# Patient Record
Sex: FEMALE | Race: OTHER | Employment: UNEMPLOYED | URBAN - METROPOLITAN AREA
[De-identification: names, ages, dates, MRNs, and addresses within clinical notes are randomized per-mention and may not be internally consistent; named-entity substitution may affect disease eponyms.]

---

## 2020-12-09 ENCOUNTER — TELEPHONE (OUTPATIENT)
Dept: NEUROLOGY | Age: 55
End: 2020-12-09

## 2020-12-10 ENCOUNTER — OFFICE VISIT (OUTPATIENT)
Dept: NEUROLOGY | Age: 55
End: 2020-12-10
Payer: MEDICAID

## 2020-12-10 VITALS
RESPIRATION RATE: 20 BRPM | SYSTOLIC BLOOD PRESSURE: 128 MMHG | TEMPERATURE: 98 F | OXYGEN SATURATION: 97 % | HEART RATE: 72 BPM | DIASTOLIC BLOOD PRESSURE: 82 MMHG

## 2020-12-10 DIAGNOSIS — R29.2 HYPERREFLEXIA OF LOWER EXTREMITY: ICD-10-CM

## 2020-12-10 DIAGNOSIS — I63.9 LEFT PONTINE CVA (HCC): Primary | ICD-10-CM

## 2020-12-10 DIAGNOSIS — E78.2 MIXED HYPERLIPIDEMIA: ICD-10-CM

## 2020-12-10 DIAGNOSIS — R20.0 LEFT LEG NUMBNESS: ICD-10-CM

## 2020-12-10 DIAGNOSIS — I77.3 FIBROMUSCULAR DYSPLASIA OF BOTH CAROTID ARTERIES (HCC): ICD-10-CM

## 2020-12-10 PROCEDURE — 99205 OFFICE O/P NEW HI 60 MIN: CPT | Performed by: PSYCHIATRY & NEUROLOGY

## 2020-12-10 RX ORDER — LANOLIN ALCOHOL/MO/W.PET/CERES
5000 CREAM (GRAM) TOPICAL DAILY
COMMUNITY

## 2020-12-10 RX ORDER — ASPIRIN 81 MG/1
TABLET ORAL DAILY
COMMUNITY
End: 2021-09-30

## 2020-12-10 RX ORDER — CLOPIDOGREL BISULFATE 75 MG/1
TABLET ORAL
COMMUNITY
End: 2020-12-16 | Stop reason: ALTCHOICE

## 2020-12-10 NOTE — PROGRESS NOTES
NEUROLOGY CLINIC NOTE    Patient ID:  Chito Self  [id number]  54 y.o.  1965    Date of Consultation:  December 10, 2020    Referring Physician: Dr. Dianna Javier    Reason for Consultation:  Numbness left leg and foot    Chief Complaint   Patient presents with    New Patient     numbness in left leg and foot        History of Present Illness: There are no active problems to display for this patient. Past Medical History:   Diagnosis Date    Cerebral artery occlusion with cerebral infarction [facility])       Past Surgical History:   Procedure Laterality Date    HX HYSTERECTOMY  2007      Prior to Admission medications    Medication Sig Start Date End Date Taking? Authorizing Provider   clopidogreL (Plavix) 75 mg tab Take  by mouth. Yes Provider, Historical   aspirin delayed-release 81 mg tablet Take  by mouth daily. Yes Provider, Historical   cyanocobalamin (VITAMIN B12) 500 mcg tablet Take 5,000 mcg by mouth daily. Yes Provider, Historical     No Known Allergies   Social History     Tobacco Use    Smoking status: Not on file   Substance Use Topics    Alcohol use: Not on file      Family History   Problem Relation Age of Onset    Diabetes Mother     Hypertension Mother     Heart Disease Father     Migraines Sister         Subjective:      Chito Self is a 54 y.o. RH female with history of pontine CVA who was referred here by Dr. Dianna Javier for further evaluation of her left leg numbness. Review of records reveal condition started 3/19/2020 when patient was driving she was seeing 2 cars. Also felt pressure behind her eyes. Blurring and seeing double. Patient was admitted at [facility] in [place] last 3/23/2020 up to 3/25/2020. Patient was seen by ophthalmology and referred to the emergency room for MRI with and without contrast and consultation with neurology.   Exam then revealed right eye sluggish abduction with left eye unable to fully converge with horizontal nystagmus with rotational component. MRI of the brain and orbits with and without contrast did not reveal any significant pathology. No abnormal enhancements. CT angiogram of the head and neck revealed fibromuscular dysplasia of bilateral internal carotid arteries. Lumbar puncture was done with unremarkable cell counts and normal protein and glucose. Negative CMV, EBV, HSV, Lyme titers, West Nile and syphilis. Note mentions on day of discharge her blurry vision and diplopia had improved significantly but patient was reporting graying out of her vision on the right eye. Seen by ophthalmology with no changes in their exam.  CBC, BMP, TSH, B12 and folate were unremarkable. Hemoglobin A1c was slightly elevated at 5.9. Patient was admitted again 4/19/2020 and discharged 4/20/2020 in the same hospital.  Note mentions patient complaining of right-sided numbness for 2 days. Patient also endorses mild right arm weakness and unsteadiness. Brain MRI without contrast done 4/19/2020 revealed new lesion within the left dorsal cyndy with mild hyperintense signal on DWI and T2 shine. Scattered foci of abnormal T2 hyperintensities in the periventricular and subcortical white matter in both hemispheres. Finding consistent with subacute infarct. Head and neck CTA revealed findings consistent with fibromuscular dysplasia within bilateral cervical internal carotid arteries and vertebral arteries. Echocardiogram done revealed EF of 63%. LDL was elevated 198. Patient was placed on aspirin 325 mg daily and Plavix 75 mg daily. Patient is also on atorvastatin 80 mg daily. Exam revealed diminished sensation right extremity. Since then patient reports improvement of her condition. No recurrence of her diplopia and blurring of her vision. Right sided numbness and weakness improved as well. Then last 11/13/2020 she had a abrupt onset of left below the breast and flank numbness down to the left leg. It is constant.  Not a pain but merely a discomfort. Went to [facility] ER and was discharged and advised to see neurology for further evaluation. Outside reports reviewed: ER records, radiology reports, lab reports, historical medical records. Review of Systems:    A comprehensive review of systems was performed:   Constitutional: positive for none  Eyes: positive for none  Ears, nose, mouth, throat, and face: positive for none  Respiratory: positive for none  Cardiovascular: positive for none  Gastrointestinal: positive for none  Genitourinary: positive for none  Integument/breast: positive for none  Hematologic/lymphatic: positive for none  Musculoskeletal: positive for none  Neurological: positive for numbness  Behavioral/Psych: positive for none  Endocrine: positive for none  Allergic/Immunologic: positive for none      Objective:     Visit Vitals  /82   Pulse 72   Temp 98 °F (36.7 °C)   Resp 20   SpO2 97%       PHYSICAL EXAM:    General Appearance: Alert, patient appears stated age. General:  Well developed, well nourished, patient in no apparent distress. Head/Face: The head is normocephalic and atraumatic. Eyes: Conjunctivae appear normal. Sclera appear normal and non-icteric. Nose (and Sinus):   No abnormality of the nose or sinuses is noted. Oral:   Throat is clear. Lymphatics:  No lymphadenopathy in the neck/head. Neck and Thyroid:   No bruits noted in the neck. Respiratory:  Lungs clear to auscultation. Cardiovascular:  Palpation and auscultation: regular rate and rhythm. Extremity: No joint swelling, erythema or pedal edema. NEUROLOGICAL EXAM:    Appearance: The patient is well developed, well nourished, provides a coherent history and is in no acute distress. Mental Status: Oriented to time, place and person. Fluent, no aphasia or dysarthria. Mood and affect appropriate. Cranial Nerves:   Intact visual fields. ROSITA, EOM's full, no nystagmus, no ptosis.  Facial sensation is normal. Corneal reflexes are intact. Facial movement is symmetric. Hearing is normal bilaterally. Palate is midline with normal elevation. Sternocleidomastoid and trapezius muscles are normal. Tongue is midline. Motor:  5/5 strength in upper and lower proximal and distal muscles. Normal bulk and tone. No fasciculations. No pronator drift. Reflexes:   Deep tendon reflexes 2+/4 and 3+ knee and 4/4 ankle with clonus. Downgoing toes. Sensory:   Normal to cold, pinprick and vibration except decrease left lower leg to foot. Gait:  Steady. No Romberg. Can do tandem walking. Tremor:   No tremor noted. Cerebellar:  Intact FTN/YASMIN/HTS. Neurovascular:  Normal heart sounds and regular rhythm, peripheral pulses intact, and no carotid bruits. Imaging  Brain MRI x 3, head/neck CTA x 2: reviewed    Labs Reviewed      Assessment:   Left pontine CVA  Hyperlipidemia  Fibromuscular dysplasia bilateral ICA  Hyperreflexia of lower extremity    Plan:   Neurological examination mainly reveals decreased sensation left lower extremity with lower extremity hyperreflexia and ankle clonus. Need to assess for acute stroke. Status post left pontine CVA. Brain MRI without contrast was ordered to further assess. Previous echocardiogram was unremarkable. Continue aspirin 81 mg daily and Plavix 75 mg daily for stroke prophylaxis. Further intervention be done pending results of testing. Call 911 if new deficits occur. Given history of stroke patient's LDL should be less than 70. Advised strict compliance with dietary modifications and medications for hyperlipidemia. Review of head and neck CTA done twice revealed fibromuscular dysplasia changes bilateral ICA and vertebral arteries. Continue dual antiplatelet therapy. Of concern is lower extremity hyperreflexia as well as the left truncal and lower leg sensory deficits. Assess for spinal cord myelitis versus stenosis. Thoracic and lumbar MRIs were ordered.   Further intervention be done pending results of testing. All questions and concerns were answered. This note was created using voice recognition software. Despite editing, there may be syntax errors.

## 2020-12-10 NOTE — PROGRESS NOTES
Right foot numbness   Affected vision, double and blurred vision   April 18th back to the hospital numbness on the right side of her body, pressure in her head- after scans were done they found a very small clot in the back of her head     November 13th she was taking a shower, she wasn't feeling on her left side, numb all the way up from the foot to her breast   Foot feels like she is wearing compression socks, it gets very cold     Went to see PCP they gave her the plavix   The lack of sensation and numbness is getting worse on the left side is worse it started on the right side first     Still has the pressure in her head, everytime she puts her head on the pillow she feels palpitations in her head as if someone is hitting her head     December 7th went to the ER at [facility] her foot was completely numb and it is still that way, nothing was done that day she was just told to find a neurologist

## 2020-12-10 NOTE — PATIENT INSTRUCTIONS
PRESCRIPTION REFILL POLICY [facility] Statement to Patients April 1, 2014 In an effort to ensure the large volume of patient prescription refills is processed in the most efficient and expeditious manner, we are asking our patients to assist us by calling your Pharmacy for all prescription refills, this will include also your  Mail Order Pharmacy. The pharmacy will contact our office electronically to continue the refill process. Please do not wait until the last minute to call your pharmacy. We need at least 48 hours (2days) to fill prescriptions. We also encourage you to call your pharmacy before going to  your prescription to make sure it is ready. With regard to controlled substance prescription refill requests (narcotic refills) that need to be picked up at our office, we ask your cooperation by providing us with at least 72 hours (3days) notice that you will need a refill. We will not refill narcotic prescription refill requests after 4:00pm on any weekday, Monday through Thursday, or after 2:00pm on Fridays, or on the weekends. We encourage everyone to explore another way of getting your prescription refill request processed using Jibe, our patient web portal through our electronic medical record system. Jibe is an efficient and effective way to communicate your medication request directly to the office and  downloadable as an tiffanie on your smart phone . Jibe also features a review functionality that allows you to view your medication list as well as leave messages for your physician. Are you ready to get connected? If so please review the attatched instructions or speak to any of our staff to get you set up right away! Thank you so much for your cooperation. Should you have any questions please contact our Practice Administrator. The Physicians and Staff,  [facility] Aprenda cómo prevenir un ataque cerebral 
 Learning About How to Prevent a Stroke Figueroa Jeanne es un ataque cerebral? 
Un ataque cerebral se produce cuando un vaso sanguíneo en el cerebro se revienta o se obstruye debido a un coágulo de Rod. Sin la omar y el oxígeno que esta transporta, parte del cerebro comienza a morirse. La parte del cuerpo controlada por la yoly dañada del cerebro no puede funcionar adecuadamente. Jossie hay muchas cosas que usted puede hacer para ayudar a reducir lovelace riesgo de ataque cerebral. 

Qué aumenta lovelace riesgo de ataque cerebral? 
Un factor de riesgo es cualquier cosa que aumenta tanya probabilidades de tener un problema de margarita determinado. Los factores de riesgo de un ataque cerebral que usted puede tratar o cambiar incluyen: · Problemas de [employer] presión arterial pierce, la fibrilación auricular, la diabetes y el colesterol alto. · El hábito de fumar. · El consumo excesivo de alcohol. · Tener sobrepeso. · No hacer suficiente actividad física. Los factores de riesgo que usted no puede cambiar incluyen: · La edad. El riesgo de ataque cerebral aumenta con la edad. · La jes. Los afroamericanos, los [employer] y los nativos de [place] tienen un riesgo mayor que las personas de [place]. · Ser abundio. Las mujeres tienen un mayor riesgo de ataque cerebral que los hombres. · Antecedentes familiares de ataque cerebral. 
Lovelace médico puede ayudarle a conocer lovelace riesgo. Entonces, usted y lovelace médico pueden hablar sobre lo que tiene que hacer para reducirlo. Qué puede hacer para prevenir un ataque cerebral? 
· Trate cualquier problema de margarita que tenga y que aumente lovelace riesgo. · Lleve un estilo de kelly saludable para el corazón: ? No fume. Si necesita ayuda para dejar de fumar, hable con lovelace médico sobre programas y medicamentos para dejar de fumar. Estos pueden aumentar tanya probabilidades de dejar de fumar para siempre. ? Limite el alcohol a 2 bebidas al día si es hombre, y 1 bebida al día si es [place]. ? Manténgase en un peso saludable. Baje de peso si necesita hacerlo. ? Si lovelace médico lo recomienda, song más ejercicio. Caminar es loli buena opción. Poco a poco, aumente la distancia que camina cada día. Trate de hacer al menos 30 minutos de ejercicio la mayoría de los días de la [place]. ? Coma alimentos saludables para el corazón. Estos incluyen frutas, verduras, alimentos ricos en fibra y pescado. Elija alimentos que tengan un bajo contenido de [place], grasas saturadas y grasas trans. · Junto con lovelace médico, decida si además tomará medicamentos para ayudar a reducir lovelace riesgo. Por ejemplo, lovelace médico y usted podrían decidir que usted tomará un medicamento para prevenir los coágulos de Rod. Cuáles son los síntomas de un ataque cerebral? 
Los síntomas de un ataque cerebral se producen rápidamente. Un ataque cerebral puede causar: · Entumecimiento, hormigueo, debilidad, o pérdida del movimiento en la negar, el brazo o la pierna, especialmente en un solo lado del cuerpo, los cuales se presentan de manera repentina. · Cambios en la vista repentinos. · Problemas repentinos para hablar. · Confusión o problemas repentinos para comprender frases sencillas. · Problemas repentinos para caminar o de equilibrio. · Un dolor de geronimo intenso repentino que es diferente de caitie de geronimo anteriores. Las siglas FAST son Lendel Heppner sencilla de recordar los síntomas principales de un ataque cerebral. Reconocer estos síntomas le ayuda a saber cuándo llamar y pedir ayuda médica. Las letras de la palabra FAST (\"rápido\" en inglés) significan: · F ace drooping (negar caída). · A rm weakness (debilidad en el brazo). · S peech difficulty (dificultad para hablar). · T сергей to call 911 (momento de llamar al 911). Es importante llamar para conseguir ayuda médica si tiene síntomas de un ataque cerebral. Un tratamiento rápido puede salvarle la kelly.  Y puede reducirle el daño en el cerebro, de manera que tenga menos problemas después del ataque cerebral. 
La atención de seguimiento es loli parte clave de lovelace tratamiento y seguridad. Asegúrese de hacer y acudir a todas las citas, y llame a lovelace médico si está teniendo problemas. También es loli buena idea saber los resultados de tanya exámenes y mantener loli lista de los medicamentos que braulio. Dónde puede encontrar más información en inglés? Annita Ricardo a [email or web address].gray.com/ Michel [id number] en la búsqueda para aprender más acerca de \"Aprenda cómo prevenir un ataque cerebral.\" Revisado: 4 marzo, 2020               Versión del contenido: 12.6 © 5261-3691 Healthwise, Incorporated. Las instrucciones de cuidado fueron adaptadas bajo licencia por [employer] Connections (which disclaims liability or warranty for this information). Si usted tiene [place] afección médica o sobre estas instrucciones, siempre pregunte a lovelace profesional de margarita. Healthwise, Incorporated niega toda garantía o responsabilidad por lovelace uso de esta información. Aprenda cómo prevenir otro ataque cerebral 
Learning About How to Prevent Another Stroke Qué puede hacer para prevenir otro ataque cerebral? 
Después de un ataque cerebral, las personas sienten muchas emociones diferentes. A algunas personas les preocupa la posibilidad de tener otro ataque cerebral. O tra vez se sienten abrumadas por lo mucho que tienen que aprender y hacer. Algunas personas se sienten tristes o deprimidas. No importa las emociones que tenga, usted puede sentir un poco más de control y tranquilidad elaborando un plan para reducir el riesgo de sufrir otro ataque cerebral. 
[place] tanya medicamentos Tendrá que anahi medicamentos para ayudar a prevenir otro ataque cerebral. Asegúrese de anahi los medicamentos tra jesse se los mccormack recetado. Y no deje de tomarlos a menos que el [employer] diga que lo song.  Si usted harlan de anahi tanya medicamentos, puede aumentar las probabilidades de Agia Thekla otro ataque cerebral. 
 Algunos de los [place]ath[place] lovelace médico puede recetarle incluyen: · Aspirina o algún otro medicamento para prevenir los coágulos de [place]. · Estatinas y otros medicamentos para reducir el colesterol. · Medicamentos para reducir la presión arterial. 
Controle otros problemas de [place] Usted puede ayudar a reducir las probabilidades de tener otro ataque cerebral controlando otros problemas de [place]. Los problemas que aumentan lovelace riesgo de tener otro ataque cerebral incluyen: · Presión arterial pierce. · Colesterol alto. · Fibrilación auricular. · Diabetes. Si usted tiene cualquiera de [facility] [place], puede manejarlos por medio de cambios saludables en el estilo de kelly junto con medicamentos. Adopte un estilo de kelly saludable · No fume ni permita que otros fumen a lovelace alrededor. Si necesita ayuda para dejar de fumar, hable con lovelace médico sobre programas y medicamentos para dejar de fumar. Estos pueden aumentar tanya probabilidades de dejar de fumar para siempre. Fumar aumenta la probabilidad de un ataque cerebral. 
· Limite el alcohol a 2 bebidas al día si es hombre y a 1 bebida al día si es [place]. · Baje de peso si necesita hacerlo. Controlar el [employer] ayudará a mantener lovelace corazón y lovelace cuerpo sanos. · Sanaztis Arnold. Pregúntele a lovelace médico el tipo y [address] de actividad que es seguro para usted. · Coma alimentos saludables para el corazón, jesse frutas, verduras y alimentos ricos Pablo Bhagat. También es importante: · Vacunarse contra la gripe todos los [place]. · Pedir ayuda si piensa que está deprimido.  
Norma rehabilitación después de un ataque cerebral 
Participar en un programa de rehabilitación después de un ataque cerebral le ayudará a recuperar las habilidades que perdió o sacar el bibi provecho de las capacidades restantes después de un ataque cerebral. También le ayuda a anahi medidas para evitar tener otro ataque cerebral. 
 Marsh equipo de rehabilitación le ofrecerá educación y [de-identified] para ayudarle a desarrollar nuevos hábitos saludables. Lorrine Edu a controlar otros problemas de margarita que tenga. Barrington Severin a hacer ejercicio de manera campbell, a comer de manera saludable y a dejar de fumar si usted fuma. Usted colaborará con marsh equipo para decidir qué opciones de estilo de kelly son mejores para usted. Si marsh médico no lo ha sugerido todavía, pregúntele si la rehabilitación después de un ataque cerebral es adecuada para usted. Conozca los síntomas del ataque cerebral 
Asegúrese de que conoce los síntomas del ataque cerebral. 
Las siglas FAST son Beckie Mall sencilla de recordar. Reconocer estos síntomas le ayuda a saber cuándo llamar y pedir ayuda médica. Las letras de la palabra FAST (\"rápido\" en inglés) significan: · F ace drooping (negar caída). · A rm weakness (debilidad en el brazo). · S peech difficulty (dificultad para hablar). · T сергей to call 911 (momento de llamar al 911). La atención de seguimiento es loli parte clave de marsh tratamiento y seguridad. Asegúrese de hacer y acudir a todas las citas, y llame a marsh médico si está teniendo problemas. También es loli buena idea saber los resultados de tanya exámenes y mantener loli lista de los medicamentos que braulio. Dónde puede encontrar más información en inglés? Vale Menjivar a http://www.gray.com/ Michel [email or web address] en la búsqueda para aprender más acerca de \"Aprenda cómo prevenir otro ataque cerebral.\" Revisado: 4 marzo, 2020               Versión del contenido: 12.6 © 4156-6377 [employer]. Las instrucciones de cuidado fueron adaptadas bajo licencia por Good Help Connections (which disclaims liability or warranty for this information). Si usted tiene [place] afección médica o sobre estas instrucciones, siempre pregunte a marsh profesional de margarita.  [employer] niega toda garantía o responsabilidad por marsh uso de esta información.

## 2020-12-13 ENCOUNTER — HOSPITAL ENCOUNTER (OUTPATIENT)
Dept: MRI IMAGING | Age: 55
Discharge: HOME | End: 2020-12-13
Attending: PSYCHIATRY & NEUROLOGY

## 2020-12-13 VITALS — WEIGHT: 185 LBS

## 2020-12-13 DIAGNOSIS — I63.9 LEFT PONTINE CVA (HCC): ICD-10-CM

## 2020-12-13 DIAGNOSIS — R20.0 LEFT LEG NUMBNESS: ICD-10-CM

## 2020-12-13 DIAGNOSIS — R29.2 HYPERREFLEXIA OF LOWER EXTREMITY: ICD-10-CM

## 2020-12-13 PROCEDURE — 72157 MRI CHEST SPINE W/O & W/DYE: CPT

## 2020-12-13 PROCEDURE — A9575 INJ GADOTERATE MEGLUMI 0.1ML: HCPCS | Performed by: PSYCHIATRY & NEUROLOGY

## 2020-12-13 PROCEDURE — 74011250636 HC RX REV CODE- 250/636: Performed by: PSYCHIATRY & NEUROLOGY

## 2020-12-13 PROCEDURE — 72158 MRI LUMBAR SPINE W/O & W/DYE: CPT

## 2020-12-13 PROCEDURE — 70551 MRI BRAIN STEM W/O DYE: CPT

## 2020-12-13 RX ORDER — GADOTERATE MEGLUMINE 376.9 MG/ML
17 INJECTION INTRAVENOUS
Status: COMPLETED | OUTPATIENT
Start: 2020-12-13 | End: 2020-12-13

## 2020-12-13 RX ADMIN — GADOTERATE MEGLUMINE 17 ML: 376.9 INJECTION INTRAVENOUS at 10:30

## 2020-12-16 ENCOUNTER — OFFICE VISIT (OUTPATIENT)
Dept: NEUROLOGY | Age: 55
End: 2020-12-16
Payer: MEDICAID

## 2020-12-16 VITALS
RESPIRATION RATE: 20 BRPM | HEART RATE: 88 BPM | OXYGEN SATURATION: 98 % | SYSTOLIC BLOOD PRESSURE: 116 MMHG | DIASTOLIC BLOOD PRESSURE: 82 MMHG

## 2020-12-16 DIAGNOSIS — E78.2 MIXED HYPERLIPIDEMIA: ICD-10-CM

## 2020-12-16 DIAGNOSIS — G35 MULTIPLE SCLEROSIS (HCC): Primary | ICD-10-CM

## 2020-12-16 DIAGNOSIS — I77.3 FIBROMUSCULAR DYSPLASIA OF BOTH CAROTID ARTERIES (HCC): ICD-10-CM

## 2020-12-16 PROCEDURE — 99215 OFFICE O/P EST HI 40 MIN: CPT | Performed by: PSYCHIATRY & NEUROLOGY

## 2020-12-16 RX ORDER — FAMOTIDINE 40 MG/1
40 TABLET, FILM COATED ORAL DAILY
Qty: 30 TAB | Refills: 0 | Status: SHIPPED | OUTPATIENT
Start: 2020-12-16 | End: 2021-09-30

## 2020-12-16 RX ORDER — PREDNISONE 20 MG/1
TABLET ORAL
Qty: 78 TAB | Refills: 0 | Status: SHIPPED | OUTPATIENT
Start: 2020-12-16 | End: 2021-05-11

## 2020-12-16 NOTE — PROGRESS NOTES
NEUROLOGY CLINIC NOTE    Patient ID:  Veronica Asif  [id number]  54 y.o.  1965    Date of Visit:  December 16, 2020    Referring Physician: Dr. Amalia Winters    Reason for Visit:  Numbness left leg and foot      History of Present Illness: There are no active problems to display for this patient. Past Medical History:   Diagnosis Date    Cerebral artery occlusion with cerebral infarction [facility])       Past Surgical History:   Procedure Laterality Date    HX HYSTERECTOMY  2007      Prior to Admission medications    Medication Sig Start Date End Date Taking? Authorizing Provider   clopidogreL (Plavix) 75 mg tab Take  by mouth. Provider, Historical   aspirin delayed-release 81 mg tablet Take  by mouth daily. Provider, Historical   cyanocobalamin (VITAMIN B12) 500 mcg tablet Take 5,000 mcg by mouth daily. Provider, Historical     No Known Allergies   Social History     Tobacco Use    Smoking status: Not on file   Substance Use Topics    Alcohol use: Not on file      Family History   Problem Relation Age of Onset    Diabetes Mother     Hypertension Mother     Heart Disease Father     Migraines Sister         Subjective:      Veronica Asif is a 54 y.o. RH female with history of pontine CVA who was referred here by Dr. Amalia Winters for further evaluation of her left leg numbness. Patient is here for follow up. Review of records reveal condition started 3/19/2020 when patient was driving she was seeing 2 cars. Also felt pressure behind her eyes. Blurring and seeing double. Patient was admitted at [facility] in [place] last 3/23/2020 up to 3/25/2020. Patient was seen by ophthalmology and referred to the emergency room for MRI with and without contrast and consultation with neurology. Exam then revealed right eye sluggish abduction with left eye unable to fully converge with horizontal nystagmus with rotational component.   MRI of the brain and orbits with and without contrast did not reveal any significant pathology. No abnormal enhancements. CT angiogram of the head and neck revealed fibromuscular dysplasia of bilateral internal carotid arteries. Lumbar puncture was done with unremarkable cell counts and normal protein and glucose. Negative CMV, EBV, HSV, Lyme titers, West Nile and syphilis. Note mentions on day of discharge her blurry vision and diplopia had improved significantly but patient was reporting graying out of her vision on the right eye. Seen by ophthalmology with no changes in their exam.  CBC, BMP, TSH, B12 and folate were unremarkable. Hemoglobin A1c was slightly elevated at 5.9. Patient was admitted again 4/19/2020 and discharged 4/20/2020 in the same hospital.  Note mentions patient complaining of right-sided numbness for 2 days. Patient also endorses mild right arm weakness and unsteadiness. Brain MRI without contrast done 4/19/2020 revealed new lesion within the left dorsal cyndy with mild hyperintense signal on DWI and T2 shine. Scattered foci of abnormal T2 hyperintensities in the periventricular and subcortical white matter in both hemispheres. Finding consistent with subacute infarct. Head and neck CTA revealed findings consistent with fibromuscular dysplasia within bilateral cervical internal carotid arteries and vertebral arteries. Echocardiogram done revealed EF of 63%. LDL was elevated 198. Patient was placed on aspirin 325 mg daily and Plavix 75 mg daily. Patient is also on atorvastatin 80 mg daily. Exam revealed diminished sensation right extremity. Since then patient reports improvement of her condition. No recurrence of her diplopia and blurring of her vision. Right sided numbness and weakness improved as well. Then last 11/13/2020 she had a abrupt onset of left below the breast and flank numbness down to the left leg. It is constant. Not a pain but merely a discomfort.  Went to [facility] and was discharged and advised to see neurology for further evaluation. Since the last visit on 12/10/2020, patient underwent brain MRI without contrast 12/13/2020 and it showed minimal scattered foci of increased T2 signal intensity in the posterior inferior midbrain, superior cyndy, tectum on the left subcortical white matter. Differential diagnosis includes demyelinating process, infectious/inflammatory process. No evidence of any acute or old strokes. Lumbar MRI with and without contrast revealed no lumbar stenosis or pathology seen. Increased cord signal intensity at T11-T12. No abnormal postcontrast enhancement. MRI of the thoracic spine with and without contrast revealed scattered abnormal foci of increased T2 signal intensity at C7-T1, T3-T4 and T11-T12. No abnormal enhancement seen. Per patient she continues to have numbness and discomfort left lower leg down to her foot. Outside reports reviewed: radiology reports    Review of Systems:    A comprehensive review of systems was performed:   Constitutional: positive for none  Eyes: positive for none  Ears, nose, mouth, throat, and face: positive for none  Respiratory: positive for none  Cardiovascular: positive for none  Gastrointestinal: positive for none  Genitourinary: positive for none  Integument/breast: positive for none  Hematologic/lymphatic: positive for none  Musculoskeletal: positive for none  Neurological: positive for numbness  Behavioral/Psych: positive for none  Endocrine: positive for none  Allergic/Immunologic: positive for none      Objective:     Visit Vitals  /82   Pulse 88   Resp 20   SpO2 98%       PHYSICAL EXAM:      NEUROLOGICAL EXAM:    Appearance: The patient is well developed, well nourished, provides a coherent history and is in no acute distress. Mental Status: Oriented to time, place and person. Fluent, no aphasia or dysarthria. Mood and affect appropriate. Cranial Nerves:   Intact visual fields.  ROSITA, EOM's full, no nystagmus, no ptosis. Facial sensation is normal. Corneal reflexes are intact. Facial movement is symmetric. Hearing is normal bilaterally. Palate is midline with normal elevation. Sternocleidomastoid and trapezius muscles are normal. Tongue is midline. Motor:  5/5 strength in upper and lower proximal and distal muscles. Normal bulk and tone. No fasciculations. No pronator drift. Reflexes:   Deep tendon reflexes 2+/4 and 3+ knee and 4/4 ankle with clonus. Downgoing toes. Sensory:   Normal to cold, pinprick and vibration except decrease left lower leg to foot. Gait:  Steady. No Romberg. Can do tandem walking. Tremor:   No tremor noted. Cerebellar:  Intact FTN/YASMIN/HTS. Imaging  Brain MRI, thoracic and lumbar MRI: reviewed    Assessment:   Multiple sclerosis  Hyperlipidemia  Fibromuscular dysplasia bilateral ICA    Plan:   Neurological examination mainly reveals decreased sensation left lower extremity with lower extremity hyperreflexia and ankle clonus. Neuroimaging films were reviewed with the patient.  was used. Brain MRI without contrast 12/13/2020 and it showed minimal scattered foci of increased T2 signal intensity in the posterior inferior midbrain, superior cyndy, tectum on the left subcortical white matter. Differential diagnosis includes demyelinating process, infectious/inflammatory process. No evidence of any acute or old strokes. Lumbar MRI with and without contrast revealed no lumbar stenosis or pathology seen. Increased cord signal intensity at T11-T12. No abnormal postcontrast enhancement. MRI of the thoracic spine with and without contrast revealed scattered abnormal foci of increased T2 signal intensity at C7-T1, T3-T4 and T11-T12. No abnormal enhancement seen. All his findings on the brain and spinal MRIs consistent with multiple sclerosis.   Extensive discussion with the help of a professional  was done with the patient in relation to the diagnosis, prognosis and treatment options. Trial of high-dose prednisone 30-day taper to see if it improves her current symptoms. Prescription was sent to her pharmacy. GI prophylaxis was also prescribed. JO virus testing was ordered to help with choice of treatment options. Patient lives in [place] and was advised to establish care with a neurologist and then decide on a disease modifying agent for her multiple sclerosis. Would recommend either Copaxone or generic Glatopa if prefers an injectable or Aubagio. Since there is no evidence that patient actually had a stroke, she was advised to discontinue Plavix. Advised strict compliance with dietary modifications and medications for hyperlipidemia. Review of head and neck CTA done twice revealed fibromuscular dysplasia changes bilateral ICA and vertebral arteries. Continue aspirin 81 mg daily. All questions and concerns were answered. 50 minutes was spent for this visit. Greater than 50% was spent reviewing actual MRI films with the patient, discussing her condition, etiology, prognosis, treatment options    This note was created using voice recognition software. Despite editing, there may be syntax errors.      Received labs done 11/19/2020 from PCP:  Normal CBC, CMP, lipid panel (LDL 75), TSH, ARON, anticardiolipin antibodies, antiphospholipid antibodies, CRP, RF, ESR  Slightly elevated hgbA1c at 6.2, low vitamin D at 27.2

## 2020-12-16 NOTE — PATIENT INSTRUCTIONS
Esclerosis múltiple (EM): Instrucciones de cuidado Multiple Sclerosis (MS): Care Instructions Instrucciones de cuidado La esclerosis múltiple, también conocida jesse EM, es loli enfermedad que puede afectar el cerebro, la médula crow y los nervios del anabella. La EM puede causar [employer] de los músculos y la fuerza, la visión, el equilibrio, la sensibilidad y la capacidad de razonar. Cualesquiera que merced tanya síntomas, anahi los medicamentos de manera correcta y seguir la recomendación de lovelace médico para el cuidado en el [facility] pueden ayudarle a conservar lovelace calidad de kelly. La atención de seguimiento es loli parte clave de lovelace tratamiento y seguridad. Asegúrese de hacer y acudir a todas las citas, y llame a lovelace médico si está teniendo problemas. También es loli buena idea saber los resultados de tanya exámenes y mantener loli lista de los medicamentos que braulio. Cómo puede cuidarse en el [facility]? Cuidado general 
· [employer] medicamentos exactamente según las indicaciones. Llame a lovelace médico si junior estar teniendo problemas con lovelace medicamento. · Utilice un bastón, un andador o loli silla motorizada si lovelace médico así lo sugiere. · En la medida de lo posible, siga haciendo todas tanya actividades normales. · Si tiene problemas para orinar, song presión o comprima suavemente la yoly de la vejiga para estimular el flujo de [facility]. Si tiene problemas para controlar la orina, planee lovelace braulio de líquidos y las actividades de modo que tenga un inodoro disponible cuando lo necesite. · Pase tiempo con lovelace kali y tanya amigos. Únase a un miladys de apoyo para personas con EM si necesita ayuda adicional. 
· Es común que la depresión acompañe a esta afección. Avísele a lovelace médico si tiene problemas para dormir, si come [place] o no tiene apetito, o se siente chiquis o afligido todo [address]. La depresión puede tratarse con medicamentos y asesoría psicológica. Alimentación y [address] · Siga loli [employer]ell Naval. · Si tiene problemas para tragar, cambie la manera de comer y los alimentos que consume: 
? Pruebe con bebidas espesas, jesse las [employer]. [place] fáciles de tragar que otros líquidos. ? No consuma alimentos que se desmigajen con facilidad. Estos pueden hacer que se atragante. ? Utilice loli licuadora para preparar los alimentos. Los alimentos blandos [employer]. ? Coma pequeñas cantidades de comida de manera frecuente para no cansarse por consumir comidas más grandes. · Norma ejercicio la [employer] días. [place] con lovelace médico para establecer un programa de actividades físicas que incluya caminar y nadar u otros ejercicios que pueda hacer. Un fisioterapeuta puede enseñarle ejercicios si no puede caminar federico puede  las extremidades y el tronco. O puede hacer ejercicios para ayudar con lovelace coordinación y equilibrio. Usted puede contribuir a mejorar la rigidez muscular si hace ejercicios al recostarse en ciertas posiciones. Cuándo debe pedir ayuda? Llame a lovelace médico ahora mismo o busque atención médica inmediata si: 
  · Tiene un cambio en los síntomas.  
  · Se  o tiene otra lesión.  
  · Presenta síntomas de loli infección urinaria. Por ejemplo: ? Tiene omar o pus en la orina. ? Tiene dolor de espalda sandi debajo de las [place] se llama dolor en el flanco. 
? Tiene fiebre, escalofríos o [employer] cuerpo. ? Corlis Massa al Jovita Riding. ? Tiene dolor en el abdomen o la tyrone. Preste especial atención a los cambios en lovelace margarita y asegúrese de comunicarse con lovelace médico si: 
  · Desea más información sobre la esclerosis múltiple o los medicamentos.  
  · Tiene preguntas sobre tratamientos alternativos. No use ningún otro tratamiento sin hablar antes con lovelace médico.  

Dónde puede encontrar más información en inglés? Judy monzon [email or web address] Olivia Jessica [id number] en la búsqueda para aprender más acerca de \"Esclerosis múltiple (EM): Instrucciones de cuidado. \" Revisado: 20 noviembre, 2019               Versión del contenido: 12.6 © 4221-3250 Healthwise, Incorporated. Las instrucciones de cuidado fueron adaptadas bajo licencia por [employer] (which disclaims liability or warranty for this information). Si usted tiene [place] afección médica o sobre estas instrucciones, siempre pregunte a lovelace profesional de margarita. Healthwise, Incorporated niega toda garantía o responsabilidad por lovelace uso de esta información.

## 2020-12-23 ENCOUNTER — TELEPHONE (OUTPATIENT)
Dept: NEUROLOGY | Age: 55
End: 2020-12-23

## 2020-12-23 LAB — JCPYV DNA SPEC QL NAA+PROBE: NEGATIVE

## 2020-12-23 NOTE — TELEPHONE ENCOUNTER
Pt's sister called and stated she has been taking the prednisone like she is supposed to however she has been having bad stomach pains, chest pain/pressure in her chest and in her neck. Her face looks red. She does have diabetes mentioned by her sister. She does not know what to do. She was advised that she needs to go to a [address] [place] [address] so they would have her information. I would not wait until provider returns to the office next week. I strongly encouraged her to the ER to be evaluated b/c of chest pain and pressure. Her sister verbalized understanding and stated she would let us know.

## 2021-05-10 ENCOUNTER — OFFICE VISIT (OUTPATIENT)
Dept: NEUROLOGY | Age: 56
End: 2021-05-10
Payer: MEDICAID

## 2021-05-10 VITALS — RESPIRATION RATE: 20 BRPM | DIASTOLIC BLOOD PRESSURE: 78 MMHG | SYSTOLIC BLOOD PRESSURE: 118 MMHG

## 2021-05-10 DIAGNOSIS — G35 MULTIPLE SCLEROSIS (HCC): Primary | ICD-10-CM

## 2021-05-10 DIAGNOSIS — E78.2 MIXED HYPERLIPIDEMIA: ICD-10-CM

## 2021-05-10 DIAGNOSIS — I77.3 FIBROMUSCULAR DYSPLASIA OF BOTH CAROTID ARTERIES (HCC): ICD-10-CM

## 2021-05-10 PROCEDURE — 99215 OFFICE O/P EST HI 40 MIN: CPT | Performed by: PSYCHIATRY & NEUROLOGY

## 2021-05-10 RX ORDER — GLATIRAMER 40 MG/ML
40 INJECTION, SOLUTION SUBCUTANEOUS
Qty: 12 SYRINGE | Refills: 11
Start: 2021-05-10 | End: 2021-06-24 | Stop reason: SDUPTHER

## 2021-05-10 NOTE — PROGRESS NOTES
She had stopped taking her medication the prednisone, had a reaction to the prednisone she had called and was advised to go to the ER to be evaluated     She was wondering since she didn't finish the medication what would happen or what does she need to do     She wanted to ask questions about MS- is this something that has progressed or since the diagnosis has it stayed the same     Her body does go numb, does that mean progression or? She has to have some dental work and has to be under medication and wanted to make sure that it was okay with her diagnosis as well as her eye surgery would it be okay if she has to be under medication and if that would affect her as well     She wanted to know about the COVID vaccination?      She feels like an ice cold/ hot sensations in her back     Neck pain when she moves her neck   Sometimes her face goes numb but that does go away
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
## 2021-05-10 NOTE — PATIENT INSTRUCTIONS
Esclerosis múltiple (EM): Instrucciones de cuidado  Multiple Sclerosis (MS): Care Instructions  Instrucciones de cuidado  La esclerosis múltiple, también conocida jesse EM, es loli enfermedad que puede afectar el cerebro, la médula crow y los nervios del anabella. La EM puede causar [employer] de los músculos y la fuerza, la visión, el equilibrio, la sensibilidad y la capacidad de razonar. Cualesquiera que merced tanya síntomas, anahi los medicamentos de manera correcta y seguir la recomendación de lovelace médico para el cuidado en el [facility] pueden ayudarle a conservar lovelace calidad de kelly. La atención de seguimiento es loli parte clave de lovelace tratamiento y seguridad. Asegúrese de hacer y acudir a todas las citas, y llame a lovelace médico si está teniendo problemas. También es loli buena idea saber los resultados de tanya exámenes y mantener loli lista de los medicamentos que braulio. ¿Cómo puede cuidarse en el [facility]? Cuidado general  · [employer] medicamentos exactamente según las indicaciones. Llame a lovelace médico si junior estar teniendo problemas con lovelace medicamento. · Utilice un bastón, un andador o loli silla motorizada si lovelace médico así lo sugiere. · En la medida de lo posible, siga haciendo todas tanya actividades normales. · Si tiene problemas para orinar, song presión o comprima suavemente la yoly de la vejiga para estimular el flujo de [facility]. Si tiene problemas para controlar la orina, planee lovelace braulio de líquidos y las actividades de modo que tenga un inodoro disponible cuando lo necesite. · Pase tiempo con lovelace kali y tanya amigos. Únase a un miladys de apoyo para personas con EM si necesita ayuda adicional.  · Es común que la depresión acompañe a esta afección. Avísele a lovelace médico si tiene problemas para dormir, si come [place] o no tiene apetito, o se siente chiquis o afligido todo [address]. La depresión puede tratarse con medicamentos y asesoría psicológica. Alimentación y ejercicio  · Siga loli Ann Marie Gramajo.   · Si tiene problemas para tragar, cambie la manera de comer y los alimentos que consume:  ? Pruebe con bebidas espesas, jesse las [employer]. [place] fáciles de tragar que otros líquidos. ? No consuma alimentos que se desmigajen con facilidad. Estos pueden hacer que se atragante. ? Utilice loli licuadora para preparar los alimentos. Los alimentos blandos [employer]. ? Coma pequeñas cantidades de comida de manera frecuente para no cansarse por consumir comidas más grandes. · Norma ejercicio la [employer] días. [place] con lovelace médico para establecer un programa de actividades físicas que incluya caminar y nadar u otros ejercicios que pueda hacer. Un fisioterapeuta puede enseñarle ejercicios si no puede caminar federico puede  las extremidades y el tronco. O puede hacer ejercicios para ayudar con lovelace coordinación y equilibrio. Usted puede contribuir a mejorar la rigidez muscular si hace ejercicios al recostarse en ciertas posiciones. ¿Cuándo debe pedir ayuda? Llame a lovelace médico ahora mismo o busque atención médica inmediata si:    · Tiene un cambio en los síntomas.     · Se  o tiene otra lesión.     · Presenta síntomas de loli infección urinaria. Por ejemplo:  ? Tiene omar o pus en la orina. ? Tiene dolor de espalda sandi debajo de las [place] se llama dolor en el flanco.  ? Tiene fiebre, escalofríos o [employer] cuerpo. ? Kendall Altamirano al Sharri Pancake. ? Tiene dolor en el abdomen o la tyrone. Preste especial atención a los cambios en lovelace margarita y asegúrese de comunicarse con lovelace médico si:    · Desea más información sobre la esclerosis múltiple o los medicamentos.     · Tiene preguntas sobre tratamientos alternativos. No use ningún otro tratamiento sin hablar antes con lovelace médico.   ¿Dónde puede encontrar más información en inglés?   Kamilla Alicea a [email or web address].gray.[email or web address]/  Michel C221 en la búsqueda para aprender más acerca de \"Esclerosis múltiple (EM): Instrucciones de cuidado. \"  Revisado: 4 agosto, 2020               Versión del contenido: 12.8  © 2006-2021 Healthwise, Incorporated. Las instrucciones de cuidado fueron adaptadas bajo licencia por [employer] (which disclaims liability or warranty for this information). Si usted tiene [place] afección médica o sobre estas instrucciones, siempre pregunte a lovelace profesional de margarita. Healthwise, Incorporated niega toda garantía o responsabilidad por lovelace uso de esta información. Colesterol alto: Instrucciones de cuidado  High Cholesterol: Care Instructions  Instrucciones de cuidado     El colesterol es un tipo de grasa que está presente en la Ray. Es necesario para varias funciones corporales, jesse producir nuevas células. El colesterol se produce en el cuerpo y [place] proviene de los alimentos que se consumen. Tener colesterol alto significa que tiene demasiado de esta grasa en la Ray. [place] eleva lovelace riesgo de tener un ataque Vernel Shmuel y un ataque cerebral.  El LDL y el HDL son parte de lovelace colesterol total. El LDL es el colesterol \"neto\". Un LDL alto puede aumentar lovelace riesgo de tener loli enfermedad cardíaca, un ataque Steve Cramp y un ataque cerebral. El HDL es el colesterol \"chappell\". Ayuda a eliminar el colesterol neto del organismo. Un HDL alto está vinculado con un riesgo más bajo de tener loli enfermedad cardíaca, un ataque al corazón y un ataque cerebral.  Lisha niveles de colesterol ayudan a lovelace médico a determinar lovelace riesgo de tener un ataque al corazón o un ataque cerebral. Usted y lovelace médico pueden hablar acerca de si necesita reducir lovelace riesgo y del tratamiento que sea mejor para usted. Un estilo de kelly saludable para el corazón junto con medicamentos puede ayudar a reducir lovelace colesterol y lovelace riesgo.  El modo que usted elija para reducir lovelace riesgo dependerá de lo elevado que sea lovelace riesgo de tener un ataque al corazón y un ataque cerebral. También dependerá de lo que piense acerca de anahi medicamentos. La atención de seguimiento es loli parte clave de lovelace tratamiento y seguridad. Asegúrese de hacer y acudir a todas las citas, y llame a lovelace médico si está teniendo problemas. También es loli buena idea saber los resultados de tanya exámenes y mantener loli lista de los medicamentos que braulio. ¿Cómo puede cuidarse en el hogar? · Coma alimentos saludables para el corazón. ? Coma frutas, verduras, granos integrales, frijoles y otros alimentos ricos en fibra. ? Coma proteínas magras, jesse mariscos, rosina [place], frijoles, nueces y productos de soya. ? Coma grasas saludables, jesse aceite de canola y de [place]. ? Elija alimentos que tengan un bajo contenido de grasas saturadas y [place] grasas trans. ? Limite el sodio y el alcohol. ? [address] [facility] [address] y los alimentos con azúcar añadida. · [place] física. Trate de hacer actividad moderada al menos 2½ horas a la semana. O trate de hacer actividad intensa al menos 1¼ horas a la semana. Juan vez desee caminar o probar otras actividades, jesse correr, nadar, [employer] en bicicleta o jugar al tenis o practicar deportes de equipo. · Manténgase en un peso saludable o baje de peso cambiando los hábitos alimenticios y la [facility] según lo descrito arriba. Si baja aunque sea solo un poco de [place], incluso 5 a 10 libras (2.2 a 4.5 kg), puede disminuir el riesgo de tener un ataque al corazón o un ataque cerebral.  · No fume. ¿Cuándo debe pedir ayuda? Preste especial atención a los cambios en lovelace margarita y asegúrese de comunicarse con lovelace médico si:    · Necesita ayuda para hacer cambios en el estilo de kelly.     · Tiene preguntas sobre lovelace medicamento. ¿Dónde puede encontrar más información en inglés? Albino El a http://www.gray.com/  Michel L099 en la búsqueda para aprender más acerca de \"Colesterol alto: Instrucciones de cuidado. \"  Revisado: 31 agosto, 2020               Versión del contenido: 12.8  © 1107-4954 Healthwise, Incorporated. Las instrucciones de cuidado fueron adaptadas bajo licencia por [employer] (which disclaims liability or warranty for this information). Si usted tiene [place] afección médica o sobre estas instrucciones, siempre pregunte a lovelace profesional de margarita. [employer] niega toda garantía o responsabilidad por lovelace uso de esta información.

## 2021-05-21 ENCOUNTER — TELEPHONE (OUTPATIENT)
Dept: NEUROLOGY | Age: 56
End: 2021-05-21

## 2021-05-21 NOTE — TELEPHONE ENCOUNTER
----- Message from Bruna Massey sent at 5/21/2021 11:30 AM EDT -----  Regarding: Dr. Akin Davidson first and last name: Pt and niece      Reason for call: medication       Callback required yes/no and why: Yes (will need ), please notify pt as to when medication will be sent to her home address      Best contact number(s): [phone] (niece Peterson Olivares- not on PHI, but she will connect the call with pt)      Details to clarify the request: Pt had her niece on the phone to translate, but states she has not received her medication, glatiramer (Glatopa) 40 mg/mL syrg . She was told this was a medication that cannot be sent to a regular pharmacy like [facility], it has to be sent to a specialty pharmacy. She was told this medication would be shipped out to her house. She has not received this medication. She states that she is needing this for the symptoms of numbness she has been experiencing. She says she can still move and walk, but just feels numb throughout her body. She said the numbness is worse and isn't sure if she needs to go to the hospital. Please reach out to patient to discuss where medication could be and when this will be shipped.       Bruna Massey

## 2021-06-02 ENCOUNTER — OFFICE VISIT (OUTPATIENT)
Dept: NEUROLOGY | Age: 56
End: 2021-06-02
Payer: MEDICAID

## 2021-06-02 VITALS
HEIGHT: 65 IN | WEIGHT: 171 LBS | DIASTOLIC BLOOD PRESSURE: 72 MMHG | SYSTOLIC BLOOD PRESSURE: 134 MMHG | BODY MASS INDEX: 28.49 KG/M2 | HEART RATE: 78 BPM | RESPIRATION RATE: 16 BRPM | OXYGEN SATURATION: 97 %

## 2021-06-02 DIAGNOSIS — E78.2 MIXED HYPERLIPIDEMIA: ICD-10-CM

## 2021-06-02 DIAGNOSIS — R20.2 PARESTHESIA: ICD-10-CM

## 2021-06-02 DIAGNOSIS — G35 MULTIPLE SCLEROSIS (HCC): Primary | ICD-10-CM

## 2021-06-02 DIAGNOSIS — I77.3 FIBROMUSCULAR DYSPLASIA OF BOTH CAROTID ARTERIES (HCC): ICD-10-CM

## 2021-06-02 DIAGNOSIS — M62.838 MUSCLE SPASM: ICD-10-CM

## 2021-06-02 PROCEDURE — 99215 OFFICE O/P EST HI 40 MIN: CPT | Performed by: PSYCHIATRY & NEUROLOGY

## 2021-06-02 RX ORDER — GABAPENTIN 100 MG/1
CAPSULE ORAL
Qty: 180 CAPSULE | Refills: 2 | Status: SHIPPED | OUTPATIENT
Start: 2021-06-02 | End: 2021-09-30 | Stop reason: SDUPTHER
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
## 2021-06-03 NOTE — TELEPHONE ENCOUNTER
Patient had an appt. Yesterday with Dr. Miguelito Manriquez and wanted her PT and OT to be done at the same place. Faxed referrals to [facility] and called the patient to let them know that they have been faxed. I also gave the patient their phone number upon request. Patient stated understanding.

## 2021-06-24 DIAGNOSIS — G35 MULTIPLE SCLEROSIS (HCC): ICD-10-CM

## 2021-06-24 RX ORDER — GLATIRAMER 40 MG/ML
40 INJECTION, SOLUTION SUBCUTANEOUS
Qty: 12 SYRINGE | Refills: 11 | Status: SHIPPED | OUTPATIENT
Start: 2021-06-25 | End: 2021-09-30 | Stop reason: SINTOL

## 2021-06-24 NOTE — TELEPHONE ENCOUNTER
----- Message from Sammie Ash sent at 6/23/2021  2:15 PM EDT -----  Regarding: Dr. Ayla Mejía Message/Vendor Calls    Caller's first and last name: Patient      Reason for call: have not received the Mail Order Injection as of yet and is concerned      Callback required yes/no and why: yes      Best contact number(s):[phone]      Details to clarify the request:      Sammie Ash

## 2021-08-11 ENCOUNTER — DOCUMENTATION ONLY (OUTPATIENT)
Dept: NEUROLOGY | Age: 56
End: 2021-08-11

## 2021-08-11 ENCOUNTER — TELEPHONE (OUTPATIENT)
Dept: NEUROLOGY | Age: 56
End: 2021-08-11

## 2021-08-11 NOTE — TELEPHONE ENCOUNTER
Two pt identifiers confirmed. Pt's Pharm [facility] called and requested a prior auth for Glatopa that Dr. Marylene Hover filled today, 8/11/21.      PA Code: [id number]

## 2021-08-13 ENCOUNTER — TELEPHONE (OUTPATIENT)
Dept: NEUROLOGY | Age: 56
End: 2021-08-13

## 2021-08-13 NOTE — TELEPHONE ENCOUNTER
----- Message from Tiburcio Nyhan sent at 8/13/2021  1:54 PM EDT -----  Regarding: /telephone  General Message/Vendor Calls    Caller's first and last name:      Reason for call: They stated the pt needs a \"whisperject\" device. It can be verbally called in.       Callback required yes/no and why:Yes      Best contact number(s): [facility] - [phone]      Details to clarify the request:

## 2021-08-17 NOTE — TELEPHONE ENCOUNTER
Called and spoke with the pt yesterday evening with an  to let her know her glatopa was approved and that I will call to get the medication shipped to her. [facility] and they already shipped her medication out to be delivered today. Called pt and left a VM to let the pt know it will be shipped out today and has to be signed for at delivery.

## 2021-08-19 ENCOUNTER — TRANSCRIBE ORDER (OUTPATIENT)
Dept: NEUROLOGY | Age: 56
End: 2021-08-19

## 2021-08-30 ENCOUNTER — TELEPHONE (OUTPATIENT)
Dept: NEUROLOGY | Age: 56
End: 2021-08-30

## 2021-08-31 NOTE — TELEPHONE ENCOUNTER
Order placed for Autoinjector, # 1/0R, per Verbal Order from Dr. Andrés Morgan on 8/31/2021 due to patient needing to use injector for medication. Pharmacist has verified the Rx and will get it ready for the patient.

## 2021-09-02 NOTE — TELEPHONE ENCOUNTER
----- Message from [facility] sent at 9/2/2021  3:20 PM EDT -----  Regarding: Dr. Carlos Manuel Dumont first and last name: Prescription Assistance Program      Reason for call: Whisper Ject Device      Callback required yes/no and why: No      Best contact number(s): [phone] ([address])       Details to clarify the request: Pt's prescription for whisper ject device needs to  be sent to [facility].  Their fax number is [phone]      [employer]

## 2021-09-07 NOTE — TELEPHONE ENCOUNTER
Returned their call the representative was not sure why they had called the device shipped out on the 1st and the patient should have received it.

## 2021-09-30 ENCOUNTER — OFFICE VISIT (OUTPATIENT)
Dept: NEUROLOGY | Age: 56
End: 2021-09-30
Payer: MEDICAID

## 2021-09-30 VITALS
RESPIRATION RATE: 20 BRPM | HEART RATE: 78 BPM | OXYGEN SATURATION: 99 % | DIASTOLIC BLOOD PRESSURE: 90 MMHG | SYSTOLIC BLOOD PRESSURE: 140 MMHG

## 2021-09-30 DIAGNOSIS — R20.2 PARESTHESIA: ICD-10-CM

## 2021-09-30 DIAGNOSIS — I77.3 FIBROMUSCULAR DYSPLASIA OF BOTH CAROTID ARTERIES (HCC): ICD-10-CM

## 2021-09-30 DIAGNOSIS — G35 MULTIPLE SCLEROSIS (HCC): Primary | ICD-10-CM

## 2021-09-30 DIAGNOSIS — E78.2 MIXED HYPERLIPIDEMIA: ICD-10-CM

## 2021-09-30 DIAGNOSIS — M62.838 MUSCLE SPASM: ICD-10-CM

## 2021-09-30 PROCEDURE — 99214 OFFICE O/P EST MOD 30 MIN: CPT | Performed by: PSYCHIATRY & NEUROLOGY

## 2021-09-30 RX ORDER — GABAPENTIN 100 MG/1
200 CAPSULE ORAL 3 TIMES DAILY
Qty: 180 CAPSULE | Refills: 5 | Status: SHIPPED | OUTPATIENT
Start: 2021-09-30 | End: 2022-04-11 | Stop reason: SDUPTHER
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
## 2021-10-02 LAB
ALBUMIN SERPL-MCNC: 4.6 G/DL (ref 3.8–4.9)
ALBUMIN/GLOB SERPL: 2.2 {RATIO} (ref 1.2–2.2)
ALP SERPL-CCNC: 110 IU/L (ref 44–121)
ALT SERPL-CCNC: 17 IU/L (ref 0–32)
AST SERPL-CCNC: 23 IU/L (ref 0–40)
BASOPHILS # BLD AUTO: 0 X10E3/UL (ref 0–0.2)
BASOPHILS NFR BLD AUTO: 1 %
BILIRUB SERPL-MCNC: 0.4 MG/DL (ref 0–1.2)
BUN SERPL-MCNC: 21 MG/DL (ref 6–24)
BUN/CREAT SERPL: 29 (ref 9–23)
CALCIUM SERPL-MCNC: 9.4 MG/DL (ref 8.7–10.2)
CHLORIDE SERPL-SCNC: 106 MMOL/L (ref 96–106)
CO2 SERPL-SCNC: 26 MMOL/L (ref 20–29)
CREAT SERPL-MCNC: 0.72 MG/DL (ref 0.57–1)
EOSINOPHIL # BLD AUTO: 0.2 X10E3/UL (ref 0–0.4)
EOSINOPHIL NFR BLD AUTO: 3 %
ERYTHROCYTE [DISTWIDTH] IN BLOOD BY AUTOMATED COUNT: 13.4 % (ref 11.7–15.4)
GAMMA INTERFERON BACKGROUND BLD IA-ACNC: 0.04 IU/ML
GLOBULIN SER CALC-MCNC: 2.1 G/DL (ref 1.5–4.5)
GLUCOSE SERPL-MCNC: 87 MG/DL (ref 65–99)
HCT VFR BLD AUTO: 43.1 % (ref 34–46.6)
HGB BLD-MCNC: 14.2 G/DL (ref 11.1–15.9)
IMM GRANULOCYTES # BLD AUTO: 0 X10E3/UL (ref 0–0.1)
IMM GRANULOCYTES NFR BLD AUTO: 0 %
LYMPHOCYTES # BLD AUTO: 2 X10E3/UL (ref 0.7–3.1)
LYMPHOCYTES NFR BLD AUTO: 38 %
M TB IFN-G BLD-IMP: NEGATIVE
M TB IFN-G CD4+ BCKGRND COR BLD-ACNC: 0.06 IU/ML
MCH RBC QN AUTO: 28.3 PG (ref 26.6–33)
MCHC RBC AUTO-ENTMCNC: 32.9 G/DL (ref 31.5–35.7)
MCV RBC AUTO: 86 FL (ref 79–97)
MITOGEN IGNF BLD-ACNC: 1.12 IU/ML
MONOCYTES # BLD AUTO: 0.4 X10E3/UL (ref 0.1–0.9)
MONOCYTES NFR BLD AUTO: 7 %
NEUTROPHILS # BLD AUTO: 2.8 X10E3/UL (ref 1.4–7)
NEUTROPHILS NFR BLD AUTO: 51 %
PLATELET # BLD AUTO: 266 X10E3/UL (ref 150–450)
POTASSIUM SERPL-SCNC: 4.5 MMOL/L (ref 3.5–5.2)
PROT SERPL-MCNC: 6.7 G/DL (ref 6–8.5)
QUANTIFERON TB2 AG: 0.06 IU/ML
RBC # BLD AUTO: 5.02 X10E6/UL (ref 3.77–5.28)
SERVICE CMNT-IMP: NORMAL
SODIUM SERPL-SCNC: 145 MMOL/L (ref 134–144)
WBC # BLD AUTO: 5.4 X10E3/UL (ref 3.4–10.8)

## 2021-10-12 ENCOUNTER — TRANSCRIBE ORDER (OUTPATIENT)
Dept: NEUROLOGY | Age: 56
End: 2021-10-12

## 2021-10-13 ENCOUNTER — TELEPHONE (OUTPATIENT)
Dept: NEUROLOGY | Age: 56
End: 2021-10-13

## 2021-11-02 ENCOUNTER — TELEPHONE (OUTPATIENT)
Dept: NEUROLOGY | Age: 56
End: 2021-11-02

## 2022-04-11 DIAGNOSIS — G35 MULTIPLE SCLEROSIS (HCC): ICD-10-CM

## 2022-04-11 DIAGNOSIS — R20.2 PARESTHESIA: ICD-10-CM

## 2022-04-11 NOTE — TELEPHONE ENCOUNTER
P/T wants to know if there are any samples for Gabapentin in the office. She is down to her last 3 pills, I submitted a prescription request but she's requesting samples until the prescription is filled.      Please advise,     Thanks

## 2022-04-13 RX ORDER — GABAPENTIN 100 MG/1
200 CAPSULE ORAL 3 TIMES DAILY
Qty: 180 CAPSULE | Refills: 5 | Status: SHIPPED | OUTPATIENT
Start: 2022-04-13

## 2022-05-18 DIAGNOSIS — G35 MULTIPLE SCLEROSIS (HCC): ICD-10-CM

## 2022-05-18 DIAGNOSIS — R20.2 PARESTHESIA: ICD-10-CM

## 2022-05-25 RX ORDER — GABAPENTIN 100 MG/1
200 CAPSULE ORAL 3 TIMES DAILY
Qty: 180 CAPSULE | Refills: 5 | OUTPATIENT
Start: 2022-05-25